# Patient Record
Sex: MALE | Race: WHITE
[De-identification: names, ages, dates, MRNs, and addresses within clinical notes are randomized per-mention and may not be internally consistent; named-entity substitution may affect disease eponyms.]

---

## 2021-07-19 ENCOUNTER — HOSPITAL ENCOUNTER (OUTPATIENT)
Dept: HOSPITAL 47 - LABPAT | Age: 61
Discharge: HOME | End: 2021-07-19
Attending: ORTHOPAEDIC SURGERY
Payer: COMMERCIAL

## 2021-07-19 DIAGNOSIS — Z01.812: Primary | ICD-10-CM

## 2021-07-19 DIAGNOSIS — I25.2: ICD-10-CM

## 2021-07-19 DIAGNOSIS — M16.11: ICD-10-CM

## 2021-07-19 DIAGNOSIS — I44.0: ICD-10-CM

## 2021-07-19 DIAGNOSIS — R94.31: ICD-10-CM

## 2021-07-19 LAB
ALBUMIN SERPL-MCNC: 4.6 G/DL (ref 3.5–5)
ALP SERPL-CCNC: 70 U/L (ref 38–126)
ALT SERPL-CCNC: 40 U/L (ref 4–49)
ANION GAP SERPL CALC-SCNC: 8 MMOL/L
APTT BLD: 23.1 SEC (ref 22–30)
AST SERPL-CCNC: 30 U/L (ref 17–59)
BUN SERPL-SCNC: 17 MG/DL (ref 9–20)
CALCIUM SPEC-MCNC: 9.8 MG/DL (ref 8.4–10.2)
CHLORIDE SERPL-SCNC: 104 MMOL/L (ref 98–107)
CO2 SERPL-SCNC: 26 MMOL/L (ref 22–30)
ERYTHROCYTE [DISTWIDTH] IN BLOOD BY AUTOMATED COUNT: 4.68 M/UL (ref 4.3–5.9)
ERYTHROCYTE [DISTWIDTH] IN BLOOD: 12.4 % (ref 11.5–15.5)
GLUCOSE SERPL-MCNC: 96 MG/DL (ref 74–99)
HCT VFR BLD AUTO: 43.7 % (ref 39–53)
HGB BLD-MCNC: 15.3 GM/DL (ref 13–17.5)
INR PPP: 1 (ref ?–1.2)
MCH RBC QN AUTO: 32.7 PG (ref 25–35)
MCHC RBC AUTO-ENTMCNC: 35 G/DL (ref 31–37)
MCV RBC AUTO: 93.4 FL (ref 80–100)
PH UR: 6.5 [PH] (ref 5–8)
PLATELET # BLD AUTO: 256 K/UL (ref 150–450)
POTASSIUM SERPL-SCNC: 4.3 MMOL/L (ref 3.5–5.1)
PROT SERPL-MCNC: 7.2 G/DL (ref 6.3–8.2)
PT BLD: 10.4 SEC (ref 9–12)
RBC UR QL: <1 /HPF (ref 0–5)
SODIUM SERPL-SCNC: 138 MMOL/L (ref 137–145)
SP GR UR: 1 (ref 1–1.03)
UROBILINOGEN UR QL STRIP: <2 MG/DL (ref ?–2)
WBC # BLD AUTO: 8 K/UL (ref 3.8–10.6)
WBC #/AREA URNS HPF: <1 /HPF (ref 0–5)

## 2021-07-19 PROCEDURE — 80053 COMPREHEN METABOLIC PANEL: CPT

## 2021-07-19 PROCEDURE — 87070 CULTURE OTHR SPECIMN AEROBIC: CPT

## 2021-07-19 PROCEDURE — 85027 COMPLETE CBC AUTOMATED: CPT

## 2021-07-19 PROCEDURE — 81001 URINALYSIS AUTO W/SCOPE: CPT

## 2021-07-19 PROCEDURE — 93005 ELECTROCARDIOGRAM TRACING: CPT

## 2021-07-19 PROCEDURE — 85610 PROTHROMBIN TIME: CPT

## 2021-07-19 PROCEDURE — 36415 COLL VENOUS BLD VENIPUNCTURE: CPT

## 2021-07-19 PROCEDURE — 85730 THROMBOPLASTIN TIME PARTIAL: CPT

## 2021-07-27 ENCOUNTER — HOSPITAL ENCOUNTER (OUTPATIENT)
Dept: HOSPITAL 47 - OR | Age: 61
Discharge: HOME HEALTH | End: 2021-07-27
Attending: ORTHOPAEDIC SURGERY
Payer: COMMERCIAL

## 2021-07-27 VITALS — DIASTOLIC BLOOD PRESSURE: 65 MMHG | SYSTOLIC BLOOD PRESSURE: 100 MMHG | HEART RATE: 75 BPM

## 2021-07-27 VITALS — RESPIRATION RATE: 16 BRPM

## 2021-07-27 VITALS — BODY MASS INDEX: 46 KG/M2

## 2021-07-27 VITALS — TEMPERATURE: 97.5 F

## 2021-07-27 DIAGNOSIS — Z98.890: ICD-10-CM

## 2021-07-27 DIAGNOSIS — E66.01: ICD-10-CM

## 2021-07-27 DIAGNOSIS — I10: ICD-10-CM

## 2021-07-27 DIAGNOSIS — Z79.899: ICD-10-CM

## 2021-07-27 DIAGNOSIS — Z88.0: ICD-10-CM

## 2021-07-27 DIAGNOSIS — Z83.3: ICD-10-CM

## 2021-07-27 DIAGNOSIS — Z79.82: ICD-10-CM

## 2021-07-27 DIAGNOSIS — Z79.1: ICD-10-CM

## 2021-07-27 DIAGNOSIS — M16.11: Primary | ICD-10-CM

## 2021-07-27 DIAGNOSIS — E78.5: ICD-10-CM

## 2021-07-27 PROCEDURE — 73501 X-RAY EXAM HIP UNI 1 VIEW: CPT

## 2021-07-27 PROCEDURE — 88300 SURGICAL PATH GROSS: CPT

## 2021-07-27 PROCEDURE — 86850 RBC ANTIBODY SCREEN: CPT

## 2021-07-27 PROCEDURE — 97110 THERAPEUTIC EXERCISES: CPT

## 2021-07-27 PROCEDURE — 86891 AUTOLOGOUS BLOOD OP SALVAGE: CPT

## 2021-07-27 PROCEDURE — 86900 BLOOD TYPING SEROLOGIC ABO: CPT

## 2021-07-27 PROCEDURE — 27130 TOTAL HIP ARTHROPLASTY: CPT

## 2021-07-27 PROCEDURE — 86901 BLOOD TYPING SEROLOGIC RH(D): CPT

## 2021-07-27 PROCEDURE — 97161 PT EVAL LOW COMPLEX 20 MIN: CPT

## 2021-07-27 RX ADMIN — Medication PRN ML: at 10:32

## 2021-07-27 RX ADMIN — Medication PRN ML: at 09:41

## 2021-07-27 RX ADMIN — HYDROMORPHONE HYDROCHLORIDE PRN MG: 1 INJECTION, SOLUTION INTRAMUSCULAR; INTRAVENOUS; SUBCUTANEOUS at 11:08

## 2021-07-27 RX ADMIN — HYDROMORPHONE HYDROCHLORIDE PRN MG: 1 INJECTION, SOLUTION INTRAMUSCULAR; INTRAVENOUS; SUBCUTANEOUS at 11:14

## 2021-07-27 NOTE — XR
EXAMINATION TYPE: XR Hip Limited RT

 

DATE OF EXAM: 7/27/2021

 

CLINICAL HISTORY: Postoperative evaluation

 

TECHNIQUE: Single portable view of the right hip was submitted.  

 

FINDINGS: Noted are changes of total hip arthroplasty with femoral and acetabular components appearin
g well seated.  Alignment is anatomic.  Postsurgical soft tissue changes are evident. 

 

IMPRESSION: Satisfactory postoperative alignment

## 2021-07-27 NOTE — P.OP
Date of Procedure: 07/27/21


Preoperative Diagnosis: 


Severe osteoarthritis right hip


Postoperative Diagnosis: 


Severe osteoarthritis right hip


Procedure(s) Performed: 


Right total hip arthroplasty with a direct anterior approach


Implants: 


Smith & Nephew Polarstem standard size 4


Smith & Nephew R3, 3 hole hemispherical acetabular shell, 56 mm


Smith & Nephew Reflection 6.5 mm cancellus screw, 25 mm 2


Smith & Nephew R3, XLPE 20 acetabular liner 


Smith & Nephew Oxinium femoral head 36 m, +8


All components were press-fit.


The articulation is Oxinium on polyethylene.


Anesthesia: GETA


Surgeon: Danilo Esquivel


Assistant #1: Sakina Fletcher


Estimated Blood Loss (ml): 750 (321 mL returned with Cell Saver)


Pathology: other (Femoral head)


Condition: stable


Disposition: PACU


Indications for Procedure: 


After failure of conservative treatment we discussed the surgical and 

nonsurgical treatment options at length.  Patient wishes to proceed with a total

hip arthroplasty with a direct anterior approach.  Complications specific to 

this procedure were discussed at length, including but not limited to infection,

leg length discrepancy, dislocation, nerve injury, and fracture.  Covid-19 was 

also discussed at length with the patient, and they are aware of the current 

policies and procedures.  The patient was given the option of delaying surgery, 

but they elect to proceed knowing these risks.  Patient is aware of all these 

complications and informed consent was obtained


Operative Findings: 


The operative findings are consistent with severe osteoarthritis of the right 

hip


Description of Procedure: 


Patient was seen and evaluated in the preoperative area and the consent was 

reviewed.  The operative site was marked with a skin marker.  The patient was 

then brought to the operating room and given preoperative antibiotics 

intravenously.  1 g of Tranexamic acid was also given intravenously.  A general 

anesthetic was administered by the anesthesia department.   The patient was then

placed on the Clyde table with the bony prominences well-padded.  The hip area 

was then prepped with a ChloraPrep solution and draped in the usual sterile 

fashion.





A universal timeout was then performed, which confirmed the patient's name, 

surgical site, ALLERGIES, and procedure being performed on the consent.  Next 

the incision site was located at 1 cm distal to the anterior superior iliac 

spine along the flexion crease of the hip.  The skin and subcutaneous tissues 

were sharply incised.  Incision was carefully dissected down to the fascia 

overlying the tensor fascia isak muscle.  This fascia was then incised in line 

with the incision.  Care was taken to stay laterally in order to avoid injuring 

the lateral femoral cutaneous nerve.  Next, using blunt finger dissection, the 

tensor fascia isak muscle was dissected off its investing fascia.  The muscle 

was then carefully retracted laterally with a cobra retractor over the lateral 

neck of the femur.  Next, the circumflex vessels were identified and cauterized 

using the AquaMantis device.  The anterior hip capsule was then exposed.  The 

capsule was then opened and an inverted T fashion.  Cobra retractors were then 

placed intracapsularly.  The retractors were maintained intracapsular throughout

the procedure.  The proximal femur was then visualized.  A small amount of 

traction was placed on the leg.





The femoral neck was then osteotomized appropriate level above the lesser 

trochanter.  A small wedge of bone was then removed from the remaining femoral 

head.  Next, using a corkscrew the femoral head was removed from the acetabulum.

 On gross visual inspection, the femoral head had complete loss of articular 

cartilage and multiple periarticular osteophytes.  The femoral head was then 

measured.  Attention was then turned to the acetabulum.





The acetabulum was exposed and any remaining labrum was excised.  Sequential 

reaming of the acetabulum was performed using fluoroscopic guidance until there 

was a good bed of bleeding cancellus bone.  When the appropriate size was 

reached, a trial was then placed.  The position and fit of the trial was checked

with fluoroscopy.  The trial was then removed.  Then, using fluoroscopic guidanc

e, the final implant was impacted at 20 of anteversion and 40 of abduction, 

and fully seated in the acetabulum.  2 screws were then placed in the 

acetabulum.  Again fluoroscopy was used to check position of the screws.  Next, 

the liner was then impacted, with a 20 elevated liner located in the anterior 

superior quadrant.  Component locking was confirmed.





Attention was then directed to the femur.  With the aid of the Clyde table, the 

femur was externally rotated to approximately 130, extended, and adducted under

the opposite leg.  A side hook was then placed under the proximal femur, and the

side hook elevator was used to elevate the proximal femur while releasing the 

capsule.  Retractors were then placed.  A capsular release was performed, as 

well as a release of the conjoined tendon, which afforded excellent 

visualization of the proximal femur.  Next, a box osteotome was used to 

lateralize the proximal femur.  A  was then used to locate the 

femoral canal.  Sequential broaching was then performed with appropriate size 

which afforded excellent fixation in the proximal femur.  A trial was then 

placed with appropriate head and neck, and the hip was gently reduced with the 

aid of the Clyde table.  Fluoroscopy was then used to check position of the 

components, as well as to ensure equal leg lengths.  The hip was then gently 

dislocated and the trials were then removed.  Final implants were then impacted 

and the hip was again reduced.  Final fluoroscopic x-rays confirmed that the 

components were in anatomic position, as well as equal leg lengths.  The hip was

also taken through range of motion, and found to be stable.





The hip was then copiously irrigated with antibiotic solution with pulsatile 

lavage.  The hip was then irrigated with Irrisept solution.  The soft tissues 

were then injected with a ropivacaine solution, which consisted of 246.25 mg of 

ropivacaine, 0.5 mg of epinephrine, 30 mg of Toradol, 80 g of clonidine, and 

48.45 mL of sterile water, for a total of 100 mL of fluid injected.  A second 

dose of 1 g of Tranexamic acid was also given intravenously.  Any blood 

collected by Cell Saver was then returned to the patient at this time.





The fascia was then closed with 2-0 strata fix suture.  The subcutaneous tissue 

was closed with 3-0 Vicryl.  The subcuticular tissue was closed with 3-0 strata 

fix suture.  The skin was then closed with Exofin skin glue.  After the glue and

dried, and Optifoam silver impregnated dressing was applied.  The patient was 

then transferred to the recovery room in stable condition.





The assistant  CHUCK Doyle was required due to the complexity of surgery, 

and the need for skilled surgical assistant for positioning, draping, exposure, 

retraction, and closure of the wound.

## 2023-04-11 ENCOUNTER — HOSPITAL ENCOUNTER (OUTPATIENT)
Dept: HOSPITAL 47 - LABPAT | Age: 63
Discharge: HOME | End: 2023-04-11
Attending: ORTHOPAEDIC SURGERY
Payer: COMMERCIAL

## 2023-04-11 DIAGNOSIS — Z01.812: Primary | ICD-10-CM

## 2023-04-11 DIAGNOSIS — Z01.818: ICD-10-CM

## 2023-04-11 DIAGNOSIS — M17.12: ICD-10-CM

## 2023-04-11 LAB
ALBUMIN SERPL-MCNC: 4.1 G/DL (ref 3.8–4.9)
ALBUMIN/GLOB SERPL: 1.68 G/DL (ref 1.6–3.17)
ALP SERPL-CCNC: 76 U/L (ref 41–126)
ALT SERPL-CCNC: 48 U/L (ref 10–49)
ANION GAP SERPL CALC-SCNC: 5.9 MMOL/L (ref 10–18)
APTT BLD: 22.7 SEC (ref 22–30)
AST SERPL-CCNC: 37 U/L (ref 14–35)
BUN SERPL-SCNC: 13.7 MG/DL (ref 9–27)
BUN/CREAT SERPL: 17.39 RATIO (ref 12–20)
CALCIUM SPEC-MCNC: 9.4 MG/DL (ref 8.7–10.3)
CHLORIDE SERPL-SCNC: 104 MMOL/L (ref 96–109)
CO2 SERPL-SCNC: 27.2 MMOL/L (ref 20–27.5)
ERYTHROCYTE [DISTWIDTH] IN BLOOD BY AUTOMATED COUNT: 4.29 X 10*6/UL (ref 4.4–5.6)
ERYTHROCYTE [DISTWIDTH] IN BLOOD: 13 % (ref 11.5–14.5)
GLOBULIN SER CALC-MCNC: 2.4 G/DL (ref 1.6–3.3)
GLUCOSE SERPL-MCNC: 95 MG/DL (ref 70–110)
HCT VFR BLD AUTO: 41.1 % (ref 39.6–50)
HGB BLD-MCNC: 13.5 G/DL (ref 13–17)
INR PPP: 1 (ref ?–1.2)
MCH RBC QN AUTO: 31.5 PG (ref 27–32)
MCHC RBC AUTO-ENTMCNC: 32.8 G/DL (ref 32–37)
MCV RBC AUTO: 95.8 FL (ref 80–97)
NRBC BLD AUTO-RTO: 0 /100 WBCS (ref 0–0)
PH UR: 7.5 [PH] (ref 5–8)
PLATELET # BLD AUTO: 283 X 10*3/UL (ref 140–440)
POTASSIUM SERPL-SCNC: 4.5 MMOL/L (ref 3.5–5.5)
PROT SERPL-MCNC: 6.5 G/DL (ref 6.2–8.2)
PT BLD: 10.1 SEC (ref 9–12)
SODIUM SERPL-SCNC: 137 MMOL/L (ref 135–145)
SP GR UR: 1.02 (ref 1–1.03)
UROBILINOGEN UR QL: 1
WBC # BLD AUTO: 9.29 X 10*3/UL (ref 4.5–10)

## 2023-04-11 PROCEDURE — 87070 CULTURE OTHR SPECIMN AEROBIC: CPT

## 2023-04-11 PROCEDURE — 85610 PROTHROMBIN TIME: CPT

## 2023-04-11 PROCEDURE — 85027 COMPLETE CBC AUTOMATED: CPT

## 2023-04-11 PROCEDURE — 80053 COMPREHEN METABOLIC PANEL: CPT

## 2023-04-11 PROCEDURE — 93005 ELECTROCARDIOGRAM TRACING: CPT

## 2023-04-11 PROCEDURE — 81001 URINALYSIS AUTO W/SCOPE: CPT

## 2023-04-11 PROCEDURE — 85730 THROMBOPLASTIN TIME PARTIAL: CPT

## 2023-04-18 ENCOUNTER — HOSPITAL ENCOUNTER (OUTPATIENT)
Dept: HOSPITAL 47 - OR | Age: 63
Discharge: HOME HEALTH | End: 2023-04-18
Attending: ORTHOPAEDIC SURGERY
Payer: COMMERCIAL

## 2023-04-18 VITALS — TEMPERATURE: 97.5 F

## 2023-04-18 VITALS — SYSTOLIC BLOOD PRESSURE: 102 MMHG | DIASTOLIC BLOOD PRESSURE: 64 MMHG | HEART RATE: 52 BPM

## 2023-04-18 VITALS — BODY MASS INDEX: 40.6 KG/M2

## 2023-04-18 VITALS — RESPIRATION RATE: 18 BRPM

## 2023-04-18 DIAGNOSIS — Z79.899: ICD-10-CM

## 2023-04-18 DIAGNOSIS — J45.909: ICD-10-CM

## 2023-04-18 DIAGNOSIS — Z96.642: ICD-10-CM

## 2023-04-18 DIAGNOSIS — G89.18: ICD-10-CM

## 2023-04-18 DIAGNOSIS — F10.20: ICD-10-CM

## 2023-04-18 DIAGNOSIS — E66.01: ICD-10-CM

## 2023-04-18 DIAGNOSIS — Z88.0: ICD-10-CM

## 2023-04-18 DIAGNOSIS — M17.12: Primary | ICD-10-CM

## 2023-04-18 DIAGNOSIS — Z79.1: ICD-10-CM

## 2023-04-18 DIAGNOSIS — Z83.3: ICD-10-CM

## 2023-04-18 DIAGNOSIS — Z79.51: ICD-10-CM

## 2023-04-18 DIAGNOSIS — E78.5: ICD-10-CM

## 2023-04-18 DIAGNOSIS — I10: ICD-10-CM

## 2023-04-18 DIAGNOSIS — Z87.891: ICD-10-CM

## 2023-04-18 PROCEDURE — 27447 TOTAL KNEE ARTHROPLASTY: CPT

## 2023-04-18 PROCEDURE — 64999 UNLISTED PX NERVOUS SYSTEM: CPT

## 2023-04-18 PROCEDURE — 76942 ECHO GUIDE FOR BIOPSY: CPT

## 2023-04-18 PROCEDURE — 88300 SURGICAL PATH GROSS: CPT

## 2023-04-18 PROCEDURE — 64448 NJX AA&/STRD FEM NRV NFS IMG: CPT

## 2023-04-18 PROCEDURE — 97116 GAIT TRAINING THERAPY: CPT

## 2023-04-18 PROCEDURE — 97162 PT EVAL MOD COMPLEX 30 MIN: CPT

## 2023-04-18 PROCEDURE — 73560 X-RAY EXAM OF KNEE 1 OR 2: CPT

## 2023-04-18 NOTE — P.ANPRN
Procedure Note - Anesthesia





- Nerve Block Performed


  ** Left Adductor Canal


Time Out Performed: Yes (08:21)


Date of Procedure: 04/18/23


Procedure Start Time: 08:21


Procedure Stop Time: 08:27


Location of Patient: PreOp


Indication: Acute Post-Operative Pain, Requested by Surgeon (Dr Esquivel)


Sedation Type: Sedate with meaningful contact maintained


Preparation: Sterile Prep, Sterile Dressing


Position: Supine


Catheter: Indwelling


Needle Types: Pajunk


Needle Gauge: 21


Ultrasound used to visualize needle placement: Yes


Ultrasound used to observe medication spread: Yes


Injectate: 0.5% Ropivacaine (see comment for volume) (20cc)


Blood Aspirated: No


Pain Paresthesia on Injection Noted: No


Resistance on Injection: Normal


Image Stored and Saved: Yes


Events: Uneventful and Well Tolerated

## 2023-04-18 NOTE — P.OP
Date of Procedure: 04/18/23


Preoperative Diagnosis: 


Severe osteoarthritis left knee


Postoperative Diagnosis: 


Severe osteoarthritis of the left knee


Procedure(s) Performed: 


Left total knee arthroplasty


Implants: 


Smith & Nephew Journey II CR Oxinium cruciate retaining femoral component size 

9, left


Smith & Nephew Journey nonporous tibial baseplate size 7, left


Smith & Nephew Journey II, XLPE Deep Dished articular insert, size 10 mm, Size 

7-8, left


Smith & Nephew Journey Lucie II resurfacing patellar component, oval, 38 mm


All components were cemented using Palacos R bone cement


The articulation is Oxinium on polyethylene


Anesthesia: NASEEM


Surgeon: Danilo Esquivel


Assistant #1: Sakina Fletcher


Estimated Blood Loss (ml): 50


Pathology: other (Bone and cartilage)


Condition: stable


Disposition: PACU


Indications for Procedure: 


The patient's knee is end-stage, and conservative management has failed.  The 

operation of knee replacement has been discussed at length in the office, as 

well as potential risks and complications.  These are inclusive of, but not 

limited to: Infection, bleeding, scarring, discomfort, stiffness, blood vessel 

and nerve damage, need for further surgery, failure to relieve symptoms, 

persistence, recurrence, or worsening of problems, loosening, dislocation, wear,

blood clot, pulmonary embolism, death, gait dysfunction, stiffness, and other 

risks as discussed in the office.  Patient elects to proceed and the consent 

form has been signed.


Operative Findings: 


The operative findings are consistent with severe osteoarthritis of the left 

knee


Description of Procedure: 


Patient was seen in the preoperative area and the consent was reviewed and the 

operative site was marked with a skin marker.  The patient verified the 

procedure and the operative site.  An adductor canal pain catheter and an IPACK 

block were placed by anesthesia in the preoperative area.  The patient was then 

brought to the operating room and positioned on the operating room table in the 

supine position.  Preoperative antibiotics and a gram of transexamic acid were 

given intravenously.  A general anesthetic was administered by the anesthesia 

department.  Care was taken to make sure that all pressure points were 

adequately padded.  A tourniquet was placed on the upper thigh and the lower 

extremity was prepped with ChloraPrep and draped in usual sterile fashion.    A 

universal timeout was then performed which confirmed the patient's name, 

surgical site, ALLERGIES, and consent.





The lower extremity was then exsanguinated and tourniquet was inflated to 250 

mmHg.  A standard anterior midline approach to the knee was performed.  The skin

and subcutaneous tissue were sharply dissected down to the patellar tendon.  A 

medial parapatellar arthrotomy was then performed.  The knee was then extended, 

the patellar was everted, and the knee was flexed.  The infra-patellar fat pad 

was removed in order to enhance exposure.  The anterior horns of both menisci 

were excised, and a release was performed to the posterior medial aspect of the 

knee.  On gross visual inspection, there was complete loss of articular 

cartilage in the medial and patellofemoral joint spaces.  There was also 

significant cartilage damage in the lateral compartment.  There were multiple 

periarticular osteophytes globally about the knee which were then removed with a

Ronguer.  The femoral canal was then opened with the 9.5 mm intramedullary 

drill.  The 8 mm intramedullary shane was then inserted into the femoral canal 

with the distal femoral cutting guide set for 5 of valgus.  The distal femoral 

cutting block was then pinned in place.  The intramedullary shane was then 

removed, and the distal femur was then cut.  The cutting block was then removed 

and the cut was checked for symmetry.  The resected bone was then measured to 

confirm the appropriate distal femoral resection.  Next, the sizing guide was 

then placed and set for 3 external rotation based off of the epicondylar axis 

and Suzi's line.  Pins were then placed and the drill holes, and the femur 

was sized with the sizing stylus.  The pins were then removed, and the sizing 

guide was then removed.  The spikes of the appropriate size femoral block was 

then placed into the predrilled holes, and malleted into place.  Two 45 mm pins 

were then placed into the fixation holes on the cutting block.  An tate wing 

was then used to ensure there would be no notching with the anterior cut.  The 

anterior condyles were cut without notching.  The anterior chord cut was then 

performed, followed by the posterior cut, posterior chamfer cut, and the 

anterior chamfer cut.  The collateral ligaments were protected during the entire

process.  The cutting block was then removed.  Any remaining bone and 

osteophytes were removed from the femur with a Ronguer.





Attention was then directed to the tibia.  The remaining ACL was removed with a 

Ronguer, and the tibia was then gently subluxed forward with a large bent knee 

retractor.  Any remaining menisci were excised.  The posterior lateral corner 

was cauterized in order to coagulate the lateral geniculate artery.  The extra 

medullary tibial cutting guide was then placed, set for the appropriate 

rotation, slope, and depth of resection.  The proximal tibia cutting guide was 

then pinned in place.  Proximal tibia was then cut and sized.  A curved 

osteotome was then used to remove any posterior osteophytes from the distal 

femur. 





The femoral trial was placed.  A narrow saw blade was then used to remove the 

anterior intracondylar femoral bone.  The CR notch trial was then placed.  The 

tibial trial was placed with the appropriate-sized insert.  The knee was able to

fully extend and flex to 130 and was stable throughout all range of motion.  

The knee was then extended and the patella was everted.  Patella was then 

measured, and then using an osteotomy guide, the patella was cut at the 

appropriate level.  The patellar component was sized.  The patellar drill guide 

was placed and the patella was drilled.  The patella trial was then placed.  The

knee was then taken through range of motion with the patella trial and the 

patella tracked normally using the no thumbs technique.  The patella trial was 

then removed.  The knee was then flexed and lug holes were drilled through the 

femoral trial and the femoral trial was then removed.  The tibial was then re-

exposed, and the tibial broach guide was then pinned in place after it was set 

for the appropriate rotation to allow for the most coverage without overhang.  

The tibia was then reamed and broached.





The femoral canal was plugged with autologous bone.  The cut surfaces of bone 

were then irrigated with pulsatile lavage. The knee was also irrigated with 

Irrisept solution.  The components were then opened, the cement was mixed.  

Cement was placed on the backside of the femoral, tibial, and patellar 

components.  Cement was then applied to the tibial surface and pressurized into 

the surface using finger pressurization technique.  The tibial component was 

then applied and excess cement was removed after it was impacted securely noted 

to be flush with the cut surface. 





In similar fashion, the cement was applied to the cut femoral surface, 

pressurized and using finger pressurization the component was impacted in place.

 Excess cement was removed.  The polyethylene spacer was then implanted and 

locked into position.  Patellar component was then applied in a similar 

technique and the patellar clamp was used to hold patella in place while the 

cement hardened.  The knee was held in full extension while the cement hardened.

 Once the cement had fully hardened, the knee was reinspected.  Any other cement

extrusion was removed the final range of motion testing showed range of motion 

from 0-130 with excellent stability, both medial and laterally and appropriate 

alignment of the leg.  Patella tracked normally[default value]





After the cemented hardened, the tourniquet was released and hemostasis was 

obtained.  A second gram of transexamic acid was given intravenously.  The knee 

was again irrigated.  The knee was again taken through range of motion and found

to be stable throughout all range of motion of 0-130, and the patella tracked 

normally.  The fascia was then closed with 0 Vicryl followed by #2 strata fix 

suture.  The subcutaneous tissue was closed with 3-0 Vicryl and 3-0 strata fix. 

Exofin glue was used for the skin and placed with the knee in flexion.  After 

the glue had dried, and Optafoam silver impregnated dressing was applied.  A 

lightly compressive dressing was applied using web roll and Ace wrap.  Patient 

was then transferred to the stretcher and taken to recovery room in stable 

condition.  Sponge and needle counts were correct.  





The assistant CHUCK Doyle was required due the complexity surgery and the 

need for a skilled surgical assistant.  She assisted in positioning, draping, 

retraction, and closure of the wound.

## 2023-04-18 NOTE — XR
EXAMINATION TYPE: XR knee limited LT

 

DATE OF EXAM: 4/18/2023

 

CLINICAL HISTORY: Left knee pain and arthritis status post total knee replacement.

 

TECHNIQUE:  Portable AP and crosstable lateral views of the left knee are obtained immediately postop
eratively.

 

COMPARISON: Outside Left knee x-ray November 28, 2022

 

FINDINGS:  Metallic hardware from total left knee arthroplasty is seen and appears satisfactory in al
ignment and position.  There is evidence of recent surgery with diffuse subcutaneous gas and soft tis
suraj swelling noted. 

 

 IMPRESSION:  METALLIC HARDWARE FROM TOTAL LEFT KNEE ARTHROPLASTY IS SATISFACTORY IN ALIGNMENT.

## 2023-04-18 NOTE — P.ANPRN
Procedure Note - Anesthesia





- Nerve Block Performed


  ** Left iPack


Time Out Performed: Yes


Date of Procedure: 04/18/23


Procedure Start Time: 08:28


Procedure Stop Time: 08:33


Location of Patient: PreOp


Indication: Acute Post-Operative Pain, Requested by Surgeon (Dr Esquivel)


Sedation Type: Sedate with meaningful contact maintained


Preparation: Sterile Prep


Position: Supine


Catheter: None


Needle Types: Pajunk


Needle Gauge: 21


Ultrasound used to visualize needle placement: Yes


Ultrasound used to observe medication spread: Yes


Injectate: 0.5% Ropivacaine (see comment for volume) (15cc +5cc PF Normal 

saline)


Blood Aspirated: No


Pain Paresthesia on Injection Noted: No


Resistance on Injection: Normal


Image Stored and Saved: Yes


Events: Uneventful and Well Tolerated